# Patient Record
Sex: MALE | Race: NATIVE HAWAIIAN OR OTHER PACIFIC ISLANDER
[De-identification: names, ages, dates, MRNs, and addresses within clinical notes are randomized per-mention and may not be internally consistent; named-entity substitution may affect disease eponyms.]

---

## 2017-10-07 ENCOUNTER — HOSPITAL ENCOUNTER (EMERGENCY)
Dept: HOSPITAL 26 - MED | Age: 47
Discharge: HOME | End: 2017-10-07
Payer: MEDICAID

## 2017-10-07 VITALS — SYSTOLIC BLOOD PRESSURE: 130 MMHG | DIASTOLIC BLOOD PRESSURE: 81 MMHG

## 2017-10-07 VITALS — HEIGHT: 64 IN | BODY MASS INDEX: 26.63 KG/M2 | WEIGHT: 156 LBS

## 2017-10-07 VITALS — DIASTOLIC BLOOD PRESSURE: 89 MMHG | SYSTOLIC BLOOD PRESSURE: 159 MMHG

## 2017-10-07 DIAGNOSIS — R03.0: ICD-10-CM

## 2017-10-07 DIAGNOSIS — K80.20: Primary | ICD-10-CM

## 2017-10-07 LAB
ALBUMIN FLD-MCNC: 4.1 G/DL (ref 3.4–5)
AMYLASE SERPL-CCNC: 138 U/L (ref 25–115)
ANION GAP SERPL CALCULATED.3IONS-SCNC: 11.1 MMOL/L (ref 8–16)
AST SERPL-CCNC: 318 U/L (ref 15–37)
BILIRUB SERPL-MCNC: 1.6 MG/DL (ref 0–1)
BUN SERPL-MCNC: 15 MG/DL (ref 7–18)
CHLORIDE SERPL-SCNC: 106 MMOL/L (ref 98–107)
CO2 SERPL-SCNC: 31.4 MMOL/L (ref 21–32)
CREAT SERPL-MCNC: 1.1 MG/DL (ref 0.7–1.3)
ERYTHROCYTE [DISTWIDTH] IN BLOOD BY AUTOMATED COUNT: 11.9 % (ref 11.6–13.7)
GFR SERPL CREATININE-BSD FRML MDRD: 94 ML/MIN (ref 90–?)
GLUCOSE SERPL-MCNC: 180 MG/DL (ref 74–106)
HCT VFR BLD AUTO: 49.8 % (ref 36–52)
HGB BLD-MCNC: 16.6 G/DL (ref 12–18)
LDH SERPL-CCNC: 360 U/L (ref 85–227)
LIPASE SERPL-CCNC: 105 U/L (ref 73–393)
LYMPHOCYTES NFR BLD MANUAL: 3 % (ref 20–46)
MCH RBC QN AUTO: 30 PG (ref 27–31)
MCHC RBC AUTO-ENTMCNC: 33 G/DL (ref 33–37)
MCV RBC AUTO: 89 FL (ref 80–94)
MONOCYTES NFR BLD MANUAL: 1 % (ref 5–12)
PLATELET # BLD AUTO: 356 K/UL (ref 140–450)
POTASSIUM SERPL-SCNC: 3.5 MMOL/L (ref 3.5–5.1)
RBC # BLD AUTO: 5.57 MIL/UL (ref 4.2–6.1)
SODIUM SERPL-SCNC: 145 MMOL/L (ref 136–145)
WBC # BLD AUTO: 22.3 K/UL (ref 4.8–10.8)

## 2017-10-07 PROCEDURE — 80053 COMPREHEN METABOLIC PANEL: CPT

## 2017-10-07 PROCEDURE — 99285 EMERGENCY DEPT VISIT HI MDM: CPT

## 2017-10-07 PROCEDURE — 93005 ELECTROCARDIOGRAM TRACING: CPT

## 2017-10-07 PROCEDURE — 82150 ASSAY OF AMYLASE: CPT

## 2017-10-07 PROCEDURE — 74176 CT ABD & PELVIS W/O CONTRAST: CPT

## 2017-10-07 PROCEDURE — 83690 ASSAY OF LIPASE: CPT

## 2017-10-07 PROCEDURE — 85025 COMPLETE CBC W/AUTO DIFF WBC: CPT

## 2017-10-07 PROCEDURE — 83615 LACTATE (LD) (LDH) ENZYME: CPT

## 2017-10-07 PROCEDURE — 76705 ECHO EXAM OF ABDOMEN: CPT

## 2017-10-07 PROCEDURE — 36415 COLL VENOUS BLD VENIPUNCTURE: CPT

## 2017-10-07 NOTE — NUR
46/M C/O 10/10 EPIGASTRIC PAIN x TODAY @ 1300 AFTER EATING A MEAL, SHARP 
NON-RADIATING. PT HAS N/V x 6  BUT NO DIARRHEA OR CONSTIPATION RPEORTED. BS 
ACTIVE X 4 QUADRANTS, EPIGASTRIC TENDER TO TOUCH. NO PMH REPORTED, NO RX 
REPORTED

 SKIN IS PINK/WARM/DRY; AAOX4 WITH EVEN AND STEADY GAIT; LUNGS CLEAR BL; HR 
EVEN AND REGULAR; PT DENIES ANY FEVER, CP, SOB, OR COUGH AT THIS TIME;  VSS; 
PATIENT POSITIONED FOR COMFORT; HOB ELEVATED; BEDRAILS UP X2; BED DOWN. ER MD 
MADE AWARE OF PT STATUS.

## 2017-10-07 NOTE — NUR
Patient discharged with v/s stable. Written and verbal after care instructions 
given and explained. 

Patient alert, oriented and verbalized understanding of instructions. 
Ambulatory with steady gait. All questions addressed prior to discharge. ID 
band removed. Patient advised to follow up with PMD. Rx of PROTONIX 40 MG 
given. Patient educated on indication of medication including possible reaction 
and side effects. Opportunity to ask questions provided and answered.

## 2023-04-10 ENCOUNTER — HOSPITAL ENCOUNTER (INPATIENT)
Dept: HOSPITAL 26 - MED | Age: 53
LOS: 3 days | Discharge: HOME | DRG: 263 | End: 2023-04-13
Attending: STUDENT IN AN ORGANIZED HEALTH CARE EDUCATION/TRAINING PROGRAM | Admitting: STUDENT IN AN ORGANIZED HEALTH CARE EDUCATION/TRAINING PROGRAM
Payer: MEDICAID

## 2023-04-10 VITALS — DIASTOLIC BLOOD PRESSURE: 89 MMHG | SYSTOLIC BLOOD PRESSURE: 152 MMHG

## 2023-04-10 VITALS — BODY MASS INDEX: 25.27 KG/M2 | WEIGHT: 148 LBS | HEIGHT: 64 IN

## 2023-04-10 DIAGNOSIS — Z79.899: ICD-10-CM

## 2023-04-10 DIAGNOSIS — R74.01: ICD-10-CM

## 2023-04-10 DIAGNOSIS — Z20.822: ICD-10-CM

## 2023-04-10 DIAGNOSIS — E83.51: ICD-10-CM

## 2023-04-10 DIAGNOSIS — R73.9: ICD-10-CM

## 2023-04-10 DIAGNOSIS — K21.9: ICD-10-CM

## 2023-04-10 DIAGNOSIS — K80.62: Primary | ICD-10-CM

## 2023-04-10 LAB
ALBUMIN FLD-MCNC: 3.9 G/DL (ref 3.4–5)
ANION GAP SERPL CALCULATED.3IONS-SCNC: 7.9 MMOL/L (ref 8–16)
AST SERPL-CCNC: 46 U/L (ref 15–37)
BASOPHILS # BLD AUTO: 0.1 K/UL (ref 0–0.22)
BASOPHILS NFR BLD AUTO: 0.5 % (ref 0–2)
BILIRUB SERPL-MCNC: 0.4 MG/DL (ref 0–1)
BUN SERPL-MCNC: 10 MG/DL (ref 7–18)
CHLORIDE SERPL-SCNC: 104 MMOL/L (ref 98–107)
CO2 SERPL-SCNC: 31.7 MMOL/L (ref 21–32)
CREAT SERPL-MCNC: 0.9 MG/DL (ref 0.6–1.3)
EOSINOPHIL # BLD AUTO: 0.1 K/UL (ref 0–0.4)
EOSINOPHIL NFR BLD AUTO: 0.9 % (ref 0–4)
ERYTHROCYTE [DISTWIDTH] IN BLOOD BY AUTOMATED COUNT: 12.7 % (ref 11.6–13.7)
GFR SERPL CREATININE-BSD FRML MDRD: 115 ML/MIN (ref 90–?)
GLUCOSE SERPL-MCNC: 136 MG/DL (ref 74–106)
HCT VFR BLD AUTO: 45.7 % (ref 36–52)
HGB BLD-MCNC: 15.6 G/DL (ref 12–18)
LIPASE SERPL-CCNC: 79 U/L (ref 73–393)
LYMPHOCYTES # BLD AUTO: 1.3 K/UL (ref 2–11.5)
LYMPHOCYTES NFR BLD AUTO: 10.2 % (ref 20.5–51.1)
MCH RBC QN AUTO: 30 PG (ref 27–31)
MCHC RBC AUTO-ENTMCNC: 34 G/DL (ref 33–37)
MCV RBC AUTO: 88.8 FL (ref 80–94)
MONOCYTES # BLD AUTO: 0.8 K/UL (ref 0.8–1)
MONOCYTES NFR BLD AUTO: 6.6 % (ref 1.7–9.3)
NEUTROPHILS # BLD AUTO: 10.5 K/UL (ref 1.8–7.7)
NEUTROPHILS NFR BLD AUTO: 81.8 % (ref 42.2–75.2)
PLATELET # BLD AUTO: 279 K/UL (ref 140–450)
POTASSIUM SERPL-SCNC: 4.6 MMOL/L (ref 3.5–5.1)
RBC # BLD AUTO: 5.15 MIL/UL (ref 4.2–6.1)
SODIUM SERPL-SCNC: 139 MMOL/L (ref 136–145)
WBC # BLD AUTO: 12.8 K/UL (ref 4.8–10.8)

## 2023-04-10 RX ADMIN — SODIUM CHLORIDE SCH MLS/HR: 9 INJECTION, SOLUTION INTRAVENOUS at 22:36

## 2023-04-10 NOTE — NUR
pt is presenting to the ED for evaluation of a 2-day history of intermittent, 
nonradiating, burning epigastric abdominal pain. Patient reports the pain feels 
worse after eating. pt is lart and oriented. Pt speakes Cypriot only. 
ambulatory and room air.

## 2023-04-11 VITALS — DIASTOLIC BLOOD PRESSURE: 88 MMHG | SYSTOLIC BLOOD PRESSURE: 144 MMHG

## 2023-04-11 VITALS — SYSTOLIC BLOOD PRESSURE: 130 MMHG | DIASTOLIC BLOOD PRESSURE: 75 MMHG

## 2023-04-11 VITALS — DIASTOLIC BLOOD PRESSURE: 86 MMHG | SYSTOLIC BLOOD PRESSURE: 138 MMHG

## 2023-04-11 VITALS — SYSTOLIC BLOOD PRESSURE: 127 MMHG | DIASTOLIC BLOOD PRESSURE: 76 MMHG

## 2023-04-11 VITALS — DIASTOLIC BLOOD PRESSURE: 82 MMHG | SYSTOLIC BLOOD PRESSURE: 134 MMHG

## 2023-04-11 LAB
ANION GAP SERPL CALCULATED.3IONS-SCNC: 10 MMOL/L (ref 8–16)
BASOPHILS # BLD AUTO: 0.1 K/UL (ref 0–0.22)
BASOPHILS NFR BLD AUTO: 0.5 % (ref 0–2)
BUN SERPL-MCNC: 8 MG/DL (ref 7–18)
CHLORIDE SERPL-SCNC: 105 MMOL/L (ref 98–107)
CO2 SERPL-SCNC: 30.1 MMOL/L (ref 21–32)
CREAT SERPL-MCNC: 0.8 MG/DL (ref 0.6–1.3)
EOSINOPHIL # BLD AUTO: 0.2 K/UL (ref 0–0.4)
EOSINOPHIL NFR BLD AUTO: 1.7 % (ref 0–4)
ERYTHROCYTE [DISTWIDTH] IN BLOOD BY AUTOMATED COUNT: 12.6 % (ref 11.6–13.7)
GFR SERPL CREATININE-BSD FRML MDRD: 132 ML/MIN (ref 90–?)
GLUCOSE SERPL-MCNC: 120 MG/DL (ref 74–106)
HCT VFR BLD AUTO: 42.4 % (ref 36–52)
HGB BLD-MCNC: 14.2 G/DL (ref 12–18)
LYMPHOCYTES # BLD AUTO: 1.6 K/UL (ref 2–11.5)
LYMPHOCYTES NFR BLD AUTO: 13.1 % (ref 20.5–51.1)
MCH RBC QN AUTO: 30 PG (ref 27–31)
MCHC RBC AUTO-ENTMCNC: 34 G/DL (ref 33–37)
MCV RBC AUTO: 89.5 FL (ref 80–94)
MONOCYTES # BLD AUTO: 1.3 K/UL (ref 0.8–1)
MONOCYTES NFR BLD AUTO: 10.4 % (ref 1.7–9.3)
NEUTROPHILS # BLD AUTO: 9.3 K/UL (ref 1.8–7.7)
NEUTROPHILS NFR BLD AUTO: 74.3 % (ref 42.2–75.2)
PLATELET # BLD AUTO: 258 K/UL (ref 140–450)
POTASSIUM SERPL-SCNC: 4.1 MMOL/L (ref 3.5–5.1)
RBC # BLD AUTO: 4.73 MIL/UL (ref 4.2–6.1)
SODIUM SERPL-SCNC: 141 MMOL/L (ref 136–145)
WBC # BLD AUTO: 12.5 K/UL (ref 4.8–10.8)

## 2023-04-11 PROCEDURE — 0FT44ZZ RESECTION OF GALLBLADDER, PERCUTANEOUS ENDOSCOPIC APPROACH: ICD-10-PCS | Performed by: SURGERY

## 2023-04-11 RX ADMIN — SODIUM CHLORIDE SCH MLS/HR: 9 INJECTION, SOLUTION INTRAVENOUS at 06:40

## 2023-04-11 RX ADMIN — SODIUM CHLORIDE SCH MLS/HR: 9 INJECTION, SOLUTION INTRAVENOUS at 23:15

## 2023-04-11 RX ADMIN — SODIUM CHLORIDE SCH MLS/HR: 9 INJECTION, SOLUTION INTRAVENOUS at 16:40

## 2023-04-11 NOTE — NUR
RECEIVED REPORT FROM DAY SHIFT NURSE BEE FOR CONTINUITY OF CARE. PT AWAKE SITTING IN BED 
WITH VISITOR AT BEDSIDE. RESPIRATIONS EVEN AND UNLABORED ON RA. DENIES PAIN. S/P LAP ROULA. 
NOTED 4 INCISION SITES ON ABD. IVF INFUSING TO RAC. POC DISCUSSED WITH PT AND RAY MERCADO. 
CALL LIGHT WITHIN REACH. SAFETY PRECAUTIONS IN PLACE.

## 2023-04-11 NOTE — NUR
RECEIVED TRANSFER OF CARE OF PATIENT FROM NURSE WITHIN SAME SHIFT. CARE OF PLAN OBSERVED, 
PATIENT CARE RESUMED.

## 2023-04-11 NOTE — NUR
PATIENT HAS BEEN SCREENED AND CATEGORIZED AS LOW NUTRITION RISK. PATIENT WILL BE SEEN WITHIN 
7 DAYS OF ADMISSION.



4/17/23



REVIEWED BY LINDSEY SORIANO RD

## 2023-04-11 NOTE — NUR
PT TRANSPORTED BY ER NURSE FROM ER VIA WHEELCHAIR. RECEIVED REPORT FROM ER NURSE ZACHERY FOR 
CONTINUITY OF CARE. PT A/A/O, AMBULATORY, ABLE TO MAKE NEEDS KNOWN. RESPIRATIONS EVEN AND 
UNLABORED ON RA. NO DISTRESS NOTED. COMPLAINED OF ABDOMINAL PAIN 8/10. MD WAS INFORMED, 
AWAITING FOR CALL BACK. V/S AND MRSA SWAB TAKEN. PT ON NPO. PT WAS AWARE AND NPO SIGN PLACED 
AT THE PT'S ROOM. PT ORIENTED TO PLACE, UNIT AND ROUTINE. ADMISSION COMPLETED VIA 
 #[id number]. POC DISCUSSED. CALL LIGHT WITHIN REACH. SAFETY PRECAUTIONS IN PLACE.

## 2023-04-12 VITALS — SYSTOLIC BLOOD PRESSURE: 110 MMHG | DIASTOLIC BLOOD PRESSURE: 67 MMHG

## 2023-04-12 VITALS — SYSTOLIC BLOOD PRESSURE: 115 MMHG | DIASTOLIC BLOOD PRESSURE: 69 MMHG

## 2023-04-12 VITALS — DIASTOLIC BLOOD PRESSURE: 58 MMHG | SYSTOLIC BLOOD PRESSURE: 100 MMHG

## 2023-04-12 RX ADMIN — SODIUM CHLORIDE SCH MLS/HR: 9 INJECTION, SOLUTION INTRAVENOUS at 22:40

## 2023-04-12 RX ADMIN — DULOXETINE HYDROCHLORIDE SCH MG: 30 CAPSULE, DELAYED RELEASE ORAL at 09:07

## 2023-04-12 RX ADMIN — SODIUM CHLORIDE SCH MLS/HR: 9 INJECTION, SOLUTION INTRAVENOUS at 12:50

## 2023-04-12 RX ADMIN — HYDROXYZINE HYDROCHLORIDE SCH MG: 25 TABLET, FILM COATED ORAL at 09:07

## 2023-04-12 RX ADMIN — LUBIPROSTONE SCH MCG: 24 CAPSULE, GELATIN COATED ORAL at 09:07

## 2023-04-12 RX ADMIN — LORATADINE SCH MG: 10 TABLET ORAL at 09:07

## 2023-04-12 RX ADMIN — ATORVASTATIN CALCIUM SCH MG: 20 TABLET, FILM COATED ORAL at 09:07

## 2023-04-12 RX ADMIN — SODIUM CHLORIDE SCH MLS/HR: 9 INJECTION, SOLUTION INTRAVENOUS at 07:29

## 2023-04-12 RX ADMIN — SODIUM CHLORIDE SCH MLS/HR: 9 INJECTION, SOLUTION INTRAVENOUS at 02:40

## 2023-04-12 NOTE — NUR
RECD. RESTING IN BED, AWAKE, A/OX4. RESPIRATION EVEN AND UNLABORED. IV OF NS INFUSING  
ML/HR, RIGHT AC G20. INCISION IN THE ABDOMEN (4) WITH DERMA REY, OPEN TO AIR. DRY AND 
INTACT. ENCOURAGED TO GET OUT OF BED AND AMBULATE. PAIN IN THE ABDOMEN 1/10, TOLERABLE. 
ADVISED TO CALL WHEN NEEDING PAIN MEDICATION. VERBALIZED UNDERSTANDING. FAMILY MEMBER AT 
BEDSIDE.

## 2023-04-12 NOTE — NUR
ADMINISTERED SCHEDULED MEDICATIONS. EDUCATED PT ON MEDS ADMINISTERED. ANSWERED ALL 
QUESTIONS. PT VERBALIZED UNDERSTANDING OF ALL INFORMATION PROVIDED.

## 2023-04-12 NOTE — NUR
RECEIVED REPORT FROM NIGHT SHIFT NURSE, DARRICK, FOR CONTINUITY OF CARE. PT IN BED AT THIS 
TIME, WATCHING TELEVISION. RESPIRATIONS ARE EVEN AND UNLABORED ON ROOM AIR. NO SIGNS OF 
DISTRESS NOTED. PT IS ALERT AND ORIENTED X4, ABLE TO VERBALIZE NEEDS, ABLE TO FOLLOW 
COMMANDS. PT IS ON CARDIAC DIET, TOLERATING WELL. PT HAS IV TO RAC 20G WITH NS RUNNING AT 
100ML/HR. PT IS S/P LAP ROULA, WITH 4 INCISION SITES COVERED WITH DERMABOND, NO COMPLAINTS 
OF PAIN OR DISCOMFORT. CALL LIGHT WITHIN REACH. ALL SAFETY MEASURES IN PLACE.

## 2023-04-12 NOTE — NUR
AWAKE, WATCHING TV. GIVEN HOT TEA AS REQUESTED. VERBALIZED HE DOES NOT NEED PAIN MEDICATION 
AS THIS TIME.

## 2023-04-12 NOTE — NUR
DID ROUNDS ON PT. PT IN BED WATCHING TELEVISION. RESPIRATIONS ARE EVEN AND UNLABORED. PT 
STATES PAIN IS TOLERABLE AT THIS TIME.

## 2023-04-12 NOTE — NUR
DID ROUNDS ON PT. FAMILY AT BEDSIDE. UPDATED FAMILY PER PT REQUEST. PT ALSO ASKING IF 
TOMORROW  CAN COME BY AND SPEAK TO PT REGARDING MEDICAL APPROVAL. WILL 
ENDORSE TO ONCOMING SHIFT.

## 2023-04-13 VITALS — DIASTOLIC BLOOD PRESSURE: 63 MMHG | SYSTOLIC BLOOD PRESSURE: 114 MMHG

## 2023-04-13 VITALS — SYSTOLIC BLOOD PRESSURE: 114 MMHG | DIASTOLIC BLOOD PRESSURE: 63 MMHG

## 2023-04-13 LAB
ALBUMIN FLD-MCNC: 2.6 G/DL (ref 3.4–5)
ANION GAP SERPL CALCULATED.3IONS-SCNC: 11.3 MMOL/L (ref 8–16)
AST SERPL-CCNC: 72 U/L (ref 15–37)
BASOPHILS # BLD AUTO: 0 K/UL (ref 0–0.22)
BASOPHILS NFR BLD AUTO: 0.4 % (ref 0–2)
BILIRUB SERPL-MCNC: 0.4 MG/DL (ref 0–1)
BUN SERPL-MCNC: 10 MG/DL (ref 7–18)
CHLORIDE SERPL-SCNC: 107 MMOL/L (ref 98–107)
CO2 SERPL-SCNC: 27.6 MMOL/L (ref 21–32)
CREAT SERPL-MCNC: 0.6 MG/DL (ref 0.6–1.3)
EOSINOPHIL # BLD AUTO: 0.3 K/UL (ref 0–0.4)
EOSINOPHIL NFR BLD AUTO: 2.5 % (ref 0–4)
ERYTHROCYTE [DISTWIDTH] IN BLOOD BY AUTOMATED COUNT: 12.3 % (ref 11.6–13.7)
GFR SERPL CREATININE-BSD FRML MDRD: 183 ML/MIN (ref 90–?)
GLUCOSE SERPL-MCNC: 103 MG/DL (ref 74–106)
HCT VFR BLD AUTO: 36.8 % (ref 36–52)
HGB BLD-MCNC: 12.4 G/DL (ref 12–18)
LYMPHOCYTES # BLD AUTO: 2.8 K/UL (ref 2–11.5)
LYMPHOCYTES NFR BLD AUTO: 23.5 % (ref 20.5–51.1)
MCH RBC QN AUTO: 30 PG (ref 27–31)
MCHC RBC AUTO-ENTMCNC: 34 G/DL (ref 33–37)
MCV RBC AUTO: 89.7 FL (ref 80–94)
MONOCYTES # BLD AUTO: 1.1 K/UL (ref 0.8–1)
MONOCYTES NFR BLD AUTO: 9.5 % (ref 1.7–9.3)
NEUTROPHILS # BLD AUTO: 7.6 K/UL (ref 1.8–7.7)
NEUTROPHILS NFR BLD AUTO: 64.1 % (ref 42.2–75.2)
PLATELET # BLD AUTO: 251 K/UL (ref 140–450)
POTASSIUM SERPL-SCNC: 3.9 MMOL/L (ref 3.5–5.1)
RBC # BLD AUTO: 4.11 MIL/UL (ref 4.2–6.1)
SODIUM SERPL-SCNC: 142 MMOL/L (ref 136–145)
WBC # BLD AUTO: 11.8 K/UL (ref 4.8–10.8)

## 2023-04-13 RX ADMIN — DULOXETINE HYDROCHLORIDE SCH MG: 30 CAPSULE, DELAYED RELEASE ORAL at 08:31

## 2023-04-13 RX ADMIN — LUBIPROSTONE SCH MCG: 24 CAPSULE, GELATIN COATED ORAL at 08:30

## 2023-04-13 RX ADMIN — LORATADINE SCH MG: 10 TABLET ORAL at 08:30

## 2023-04-13 RX ADMIN — HYDROXYZINE HYDROCHLORIDE SCH MG: 25 TABLET, FILM COATED ORAL at 08:31

## 2023-04-13 RX ADMIN — SODIUM CHLORIDE SCH MLS/HR: 9 INJECTION, SOLUTION INTRAVENOUS at 03:10

## 2023-04-13 RX ADMIN — SODIUM CHLORIDE SCH MLS/HR: 9 INJECTION, SOLUTION INTRAVENOUS at 08:40

## 2023-04-13 RX ADMIN — ATORVASTATIN CALCIUM SCH MG: 20 TABLET, FILM COATED ORAL at 08:30

## 2023-04-13 NOTE — NUR
RECEIVED PT FROM NIGHT SHIFT NURSE FOR CONTINUITY OF CARE. PT IN BED, AWAKE AOX4. 
RESPIRATIONS EVEN AND UNLABORED ON RA. IV ON L AC 20G, SL. PT DENIES ANY PAIN. CALL LIGHT 
WITHIN REACH. ALL SAFETY PRECAUTIONS IN PLACE.

## 2023-04-13 NOTE — NUR
DISCUSSED DC PACKET WITH PT, PROVIDED PT TEACHING REGARDING DISCHARGE. REMOVED IV AND NAME 
BAND. PT GATHERED BELONGING AND AMBULATED TO [facility]. PT IN STABLE CONDITION. 
DC TO HOME.

## 2023-08-04 ENCOUNTER — HOSPITAL ENCOUNTER (EMERGENCY)
Dept: HOSPITAL 26 - MED | Age: 53
Discharge: HOME | End: 2023-08-04
Payer: MEDICAID

## 2023-08-04 VITALS
OXYGEN SATURATION: 94 % | HEART RATE: 70 BPM | SYSTOLIC BLOOD PRESSURE: 122 MMHG | RESPIRATION RATE: 20 BRPM | TEMPERATURE: 97.2 F | DIASTOLIC BLOOD PRESSURE: 60 MMHG

## 2023-08-04 VITALS — HEIGHT: 64 IN | BODY MASS INDEX: 23.9 KG/M2 | WEIGHT: 140 LBS

## 2023-08-04 DIAGNOSIS — L50.9: Primary | ICD-10-CM

## 2023-08-04 DIAGNOSIS — K21.9: ICD-10-CM

## 2023-08-04 DIAGNOSIS — Z79.899: ICD-10-CM

## 2023-08-04 PROCEDURE — 99283 EMERGENCY DEPT VISIT LOW MDM: CPT

## 2023-08-04 NOTE — NUR
Patient discharged with v/s stable. Written and verbal after care instructions 
given and explained. 

Patient alert, oriented and verbalized understanding of instructions. 
Ambulatory with steady gait. All questions addressed prior to discharge. ID 
band removed. Patient advised to follow up with PMD. Rx of dtirizine, 
prednisone given. Patient educated on indication of medication including 
possible reaction and side effects. Opportunity to ask questions provided and 
answered.

## 2023-10-16 ENCOUNTER — HOSPITAL ENCOUNTER (EMERGENCY)
Dept: HOSPITAL 26 - MED | Age: 53
Discharge: HOME | End: 2023-10-16
Payer: COMMERCIAL

## 2023-10-16 VITALS
SYSTOLIC BLOOD PRESSURE: 119 MMHG | DIASTOLIC BLOOD PRESSURE: 78 MMHG | TEMPERATURE: 98 F | OXYGEN SATURATION: 97 % | HEART RATE: 82 BPM | RESPIRATION RATE: 18 BRPM

## 2023-10-16 VITALS
DIASTOLIC BLOOD PRESSURE: 72 MMHG | RESPIRATION RATE: 18 BRPM | TEMPERATURE: 98 F | OXYGEN SATURATION: 97 % | SYSTOLIC BLOOD PRESSURE: 118 MMHG | HEART RATE: 72 BPM

## 2023-10-16 VITALS — BODY MASS INDEX: 23.9 KG/M2 | WEIGHT: 140 LBS | HEIGHT: 64 IN

## 2023-10-16 DIAGNOSIS — J40: Primary | ICD-10-CM

## 2023-10-16 DIAGNOSIS — K21.9: ICD-10-CM

## 2023-10-16 DIAGNOSIS — Z20.822: ICD-10-CM

## 2023-10-16 DIAGNOSIS — Z79.899: ICD-10-CM

## 2023-10-16 LAB — FLUBV AG UPPER RESP QL IA.RAPID: NEGATIVE
